# Patient Record
Sex: MALE | Race: ASIAN | NOT HISPANIC OR LATINO
[De-identification: names, ages, dates, MRNs, and addresses within clinical notes are randomized per-mention and may not be internally consistent; named-entity substitution may affect disease eponyms.]

---

## 2019-10-11 ENCOUNTER — TRANSCRIPTION ENCOUNTER (OUTPATIENT)
Age: 39
End: 2019-10-11

## 2021-05-03 PROBLEM — Z00.00 ENCOUNTER FOR PREVENTIVE HEALTH EXAMINATION: Status: ACTIVE | Noted: 2021-05-03

## 2021-05-07 ENCOUNTER — NON-APPOINTMENT (OUTPATIENT)
Age: 41
End: 2021-05-07

## 2021-05-07 ENCOUNTER — RX CHANGE (OUTPATIENT)
Age: 41
End: 2021-05-07

## 2021-05-07 ENCOUNTER — APPOINTMENT (OUTPATIENT)
Dept: PULMONOLOGY | Facility: CLINIC | Age: 41
End: 2021-05-07
Payer: MEDICAID

## 2021-05-07 ENCOUNTER — APPOINTMENT (OUTPATIENT)
Dept: CARDIOLOGY | Facility: CLINIC | Age: 41
End: 2021-05-07
Payer: MEDICAID

## 2021-05-07 VITALS
HEART RATE: 94 BPM | DIASTOLIC BLOOD PRESSURE: 80 MMHG | RESPIRATION RATE: 16 BRPM | SYSTOLIC BLOOD PRESSURE: 122 MMHG | BODY MASS INDEX: 31.18 KG/M2 | TEMPERATURE: 97 F | HEIGHT: 74 IN | WEIGHT: 243 LBS | OXYGEN SATURATION: 97 %

## 2021-05-07 VITALS
WEIGHT: 243 LBS | DIASTOLIC BLOOD PRESSURE: 94 MMHG | SYSTOLIC BLOOD PRESSURE: 132 MMHG | BODY MASS INDEX: 31.18 KG/M2 | TEMPERATURE: 97.8 F | OXYGEN SATURATION: 98 % | HEIGHT: 74 IN | HEART RATE: 88 BPM | RESPIRATION RATE: 17 BRPM

## 2021-05-07 DIAGNOSIS — U07.1 COVID-19: ICD-10-CM

## 2021-05-07 DIAGNOSIS — Z78.9 OTHER SPECIFIED HEALTH STATUS: ICD-10-CM

## 2021-05-07 DIAGNOSIS — Z13.6 ENCOUNTER FOR SCREENING FOR CARDIOVASCULAR DISORDERS: ICD-10-CM

## 2021-05-07 DIAGNOSIS — R07.9 CHEST PAIN, UNSPECIFIED: ICD-10-CM

## 2021-05-07 DIAGNOSIS — R05 COUGH: ICD-10-CM

## 2021-05-07 PROCEDURE — 99072 ADDL SUPL MATRL&STAF TM PHE: CPT

## 2021-05-07 PROCEDURE — 93000 ELECTROCARDIOGRAM COMPLETE: CPT

## 2021-05-07 PROCEDURE — 99205 OFFICE O/P NEW HI 60 MIN: CPT

## 2021-05-07 PROCEDURE — 94060 EVALUATION OF WHEEZING: CPT

## 2021-05-07 PROCEDURE — 94727 GAS DIL/WSHOT DETER LNG VOL: CPT

## 2021-05-07 PROCEDURE — 94729 DIFFUSING CAPACITY: CPT

## 2021-05-07 PROCEDURE — 99203 OFFICE O/P NEW LOW 30 MIN: CPT | Mod: 25

## 2021-05-07 RX ORDER — ALBUTEROL SULFATE 90 UG/1
108 (90 BASE) INHALANT RESPIRATORY (INHALATION)
Refills: 0 | Status: ACTIVE | COMMUNITY
Start: 2021-05-07

## 2021-05-07 RX ORDER — RIVAROXABAN 10 MG/1
10 TABLET, FILM COATED ORAL DAILY
Qty: 60 | Refills: 0 | Status: DISCONTINUED | COMMUNITY
Start: 2021-05-07 | End: 2021-05-07

## 2021-05-07 NOTE — DISCUSSION/SUMMARY
[FreeTextEntry1] : 39 yo male with likely post covid 19 related complaints and PFT findings.I reviewed his hospital chart on line, including the chest xray findings. PFT results were reviewed with the patient. He was given a two week sample of trelegy with  PRN albuterol use as before. A chest xray will be repeated next month. He is to complete cardiology evaluation as planned and follow up with PMD.

## 2021-05-07 NOTE — HISTORY OF PRESENT ILLNESS
[Never] : never [TextBox_4] : 41 yo male post covid 19 infection one month ago presents for evaluation. The patient initially complained  of SOB admitted to [facility] for five days, treated with oral steroids, albuterol and supplemental oxygen. Bilateral infiltrates were noted on chest xray. Presently he complains of MORALES with dry cough but feels "better".He denies prior pulmonary problems. He was seen by cardiology this AM, who prescribed xarelto.He no longer uses oxygen and continues to use albuterol MDI PRN alone. [TextBox_29] : Denies snoring, daytime somnolence, apneic episodes, AM headaches

## 2021-05-07 NOTE — PROCEDURE
[FreeTextEntry1] : PFT results: Mild restriction with decreased diffusion. Mild bronchodilator response noted

## 2021-05-11 RX ORDER — RIVAROXABAN 10 MG/1
10 TABLET, FILM COATED ORAL
Qty: 60 | Refills: 0 | Status: DISCONTINUED | COMMUNITY
Start: 2021-05-07 | End: 2021-05-11

## 2021-05-13 ENCOUNTER — APPOINTMENT (OUTPATIENT)
Dept: CV DIAGNOSITCS | Facility: HOSPITAL | Age: 41
End: 2021-05-13
Payer: MEDICAID

## 2021-05-13 ENCOUNTER — OUTPATIENT (OUTPATIENT)
Dept: OUTPATIENT SERVICES | Facility: HOSPITAL | Age: 41
LOS: 1 days | End: 2021-05-13

## 2021-05-13 DIAGNOSIS — R07.9 CHEST PAIN, UNSPECIFIED: ICD-10-CM

## 2021-05-13 PROCEDURE — 93306 TTE W/DOPPLER COMPLETE: CPT | Mod: 26

## 2021-05-16 PROBLEM — Z13.6 SCREENING FOR CARDIOVASCULAR CONDITION: Status: ACTIVE | Noted: 2021-05-16

## 2021-05-16 NOTE — REASON FOR VISIT
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 reason not currently on anticoagulant ppx\par Chest still feels heavy, still coughing, talk/exertional SOB/dyspnea\par \par 12-lead ECG notes SR, normal axis, normal intervals.\par /80 Unremarkable physical exam\par Appers euvolemic.\par BMI 31-32\par \par Will arrange for a CXR and an echocardiogram to evaluate cardiac structure/function\par \par

## 2021-05-16 NOTE — REVIEW OF SYSTEMS
[SOB] : shortness of breath [Dyspnea on exertion] : dyspnea during exertion [Chest Discomfort] : chest discomfort [Palpitations] : palpitations [Negative] : Heme/Lymph

## 2021-06-14 RX ORDER — APIXABAN 2.5 MG/1
2.5 TABLET, FILM COATED ORAL
Qty: 60 | Refills: 0 | Status: ACTIVE | COMMUNITY
Start: 2021-05-11 | End: 1900-01-01

## 2021-06-18 ENCOUNTER — APPOINTMENT (OUTPATIENT)
Dept: PULMONOLOGY | Facility: CLINIC | Age: 41
End: 2021-06-18
Payer: MEDICAID

## 2021-06-18 VITALS
SYSTOLIC BLOOD PRESSURE: 148 MMHG | RESPIRATION RATE: 16 BRPM | HEART RATE: 107 BPM | WEIGHT: 248 LBS | OXYGEN SATURATION: 96 % | BODY MASS INDEX: 31.83 KG/M2 | DIASTOLIC BLOOD PRESSURE: 98 MMHG | HEIGHT: 74 IN | TEMPERATURE: 96 F

## 2021-06-18 DIAGNOSIS — R93.89 ABNORMAL FINDINGS ON DIAGNOSTIC IMAGING OF OTHER SPECIFIED BODY STRUCTURES: ICD-10-CM

## 2021-06-18 DIAGNOSIS — B94.8 SEQUELAE OF OTHER SPECIFIED INFECTIOUS AND PARASITIC DISEASES: ICD-10-CM

## 2021-06-18 PROCEDURE — 99213 OFFICE O/P EST LOW 20 MIN: CPT

## 2021-06-18 NOTE — DISCUSSION/SUMMARY
[FreeTextEntry1] : 39 yo male with resolution of covid 19 related complaints. I reviewed the chest xray images on line and discussed the findings with the patient. The abnormalities reported by the radiologist are subtle at best, but a non contrast chest CT will be performed for completion of his evaluation. He ia to follow up with his PMD as before.

## 2021-06-18 NOTE — REVIEW OF SYSTEMS
[Cough] : no cough [Sputum] : no sputum [SOB on Exertion] : no sob on exertion [Negative] : Endocrine

## 2021-06-18 NOTE — HISTORY OF PRESENT ILLNESS
[Never] : never [TextBox_4] : 41 yo male with hx of covid 19 infection presents for follow up. The patient feels "much better" with pulmonary complaints. He has not used albuterol. A chest xray was performed recently. [TextBox_29] : Denies snoring, daytime somnolence, apneic episodes, AM headaches

## 2021-10-08 ENCOUNTER — NON-APPOINTMENT (OUTPATIENT)
Age: 41
End: 2021-10-08

## 2022-04-19 ENCOUNTER — APPOINTMENT (OUTPATIENT)
Dept: PULMONOLOGY | Facility: CLINIC | Age: 42
End: 2022-04-19
Payer: MEDICAID

## 2022-04-19 VITALS
TEMPERATURE: 98 F | HEART RATE: 81 BPM | SYSTOLIC BLOOD PRESSURE: 136 MMHG | WEIGHT: 253 LBS | OXYGEN SATURATION: 98 % | BODY MASS INDEX: 32.48 KG/M2 | DIASTOLIC BLOOD PRESSURE: 96 MMHG

## 2022-04-19 DIAGNOSIS — J98.4 OTHER DISORDERS OF LUNG: ICD-10-CM

## 2022-04-19 DIAGNOSIS — R06.00 DYSPNEA, UNSPECIFIED: ICD-10-CM

## 2022-04-19 DIAGNOSIS — R07.89 OTHER CHEST PAIN: ICD-10-CM

## 2022-04-19 PROCEDURE — 94729 DIFFUSING CAPACITY: CPT

## 2022-04-19 PROCEDURE — 94060 EVALUATION OF WHEEZING: CPT

## 2022-04-19 PROCEDURE — 99214 OFFICE O/P EST MOD 30 MIN: CPT | Mod: 25

## 2022-04-19 PROCEDURE — 94727 GAS DIL/WSHOT DETER LNG VOL: CPT

## 2022-04-19 RX ORDER — ALBUTEROL SULFATE 90 UG/1
108 (90 BASE) INHALANT RESPIRATORY (INHALATION)
Qty: 1 | Refills: 3 | Status: ACTIVE | COMMUNITY
Start: 2021-05-07 | End: 1900-01-01

## 2022-05-21 PROBLEM — J98.4 RESTRICTIVE AIRWAY DISEASE: Status: ACTIVE | Noted: 2022-05-21

## 2022-05-21 PROBLEM — R06.00 DOE (DYSPNEA ON EXERTION): Status: ACTIVE | Noted: 2021-05-07

## 2022-05-21 PROBLEM — R07.89 ATYPICAL CHEST PAIN: Status: ACTIVE | Noted: 2022-05-21

## 2022-05-21 NOTE — DISCUSSION/SUMMARY
[FreeTextEntry1] : 42 yo male with atypical chest pain with mild restriction on PFT with normal diffusion with mild bronchodilator response. I reviewed the study with the patient. PRN albuterol MDI use recommended. Cardiac evaluation with 2D echo recommended. Repeat chest CT and PFT in the future.He is to follow up with his PMD as before.

## 2022-05-21 NOTE — HISTORY OF PRESENT ILLNESS
[Never] : never [TextBox_4] : 42 yo male with abnormal chest CT findings presents complaining of chest heaviness with MORALES for three weeks. The patient denies cough or hemoptysis. He states that his symptoms resolved spontaneously recently without albuterol use.He did not use albuterol MDI prescribed in the past. [TextBox_29] : Denies snoring, daytime somnolence, apneic episodes, AM headaches

## 2022-05-21 NOTE — REVIEW OF SYSTEMS
[Cough] : no cough [Hemoptysis] : no hemoptysis [Chest Tightness] : chest tightness [Sputum] : no sputum [SOB on Exertion] : sob on exertion [Negative] : Endocrine

## 2022-10-18 ENCOUNTER — APPOINTMENT (OUTPATIENT)
Dept: PULMONOLOGY | Facility: CLINIC | Age: 42
End: 2022-10-18

## 2024-12-17 ENCOUNTER — APPOINTMENT (OUTPATIENT)
Dept: PULMONOLOGY | Facility: CLINIC | Age: 44
End: 2024-12-17
Payer: MEDICAID

## 2024-12-17 ENCOUNTER — NON-APPOINTMENT (OUTPATIENT)
Age: 44
End: 2024-12-17

## 2024-12-17 VITALS
DIASTOLIC BLOOD PRESSURE: 98 MMHG | BODY MASS INDEX: 35.55 KG/M2 | OXYGEN SATURATION: 97 % | HEART RATE: 106 BPM | HEIGHT: 74 IN | WEIGHT: 277 LBS | SYSTOLIC BLOOD PRESSURE: 138 MMHG | TEMPERATURE: 96.9 F

## 2024-12-17 PROCEDURE — 99214 OFFICE O/P EST MOD 30 MIN: CPT | Mod: 25

## 2024-12-17 PROCEDURE — 94729 DIFFUSING CAPACITY: CPT

## 2024-12-17 PROCEDURE — 94727 GAS DIL/WSHOT DETER LNG VOL: CPT

## 2024-12-17 PROCEDURE — 94060 EVALUATION OF WHEEZING: CPT

## 2024-12-17 RX ORDER — ALBUTEROL SULFATE 90 UG/1
108 (90 BASE) INHALANT RESPIRATORY (INHALATION)
Qty: 1 | Refills: 3 | Status: ACTIVE | COMMUNITY
Start: 2024-12-17 | End: 1900-01-01

## 2025-03-18 ENCOUNTER — APPOINTMENT (OUTPATIENT)
Dept: PULMONOLOGY | Facility: CLINIC | Age: 45
End: 2025-03-18
Payer: MEDICAID

## 2025-03-18 VITALS
BODY MASS INDEX: 35.68 KG/M2 | HEIGHT: 74 IN | SYSTOLIC BLOOD PRESSURE: 132 MMHG | OXYGEN SATURATION: 96 % | TEMPERATURE: 97.3 F | HEART RATE: 98 BPM | WEIGHT: 278 LBS | DIASTOLIC BLOOD PRESSURE: 76 MMHG

## 2025-03-18 VITALS
RESPIRATION RATE: 16 BRPM | OXYGEN SATURATION: 96 % | WEIGHT: 278 LBS | BODY MASS INDEX: 35.68 KG/M2 | HEART RATE: 98 BPM | SYSTOLIC BLOOD PRESSURE: 132 MMHG | TEMPERATURE: 97.3 F | DIASTOLIC BLOOD PRESSURE: 76 MMHG | HEIGHT: 74 IN

## 2025-03-18 PROCEDURE — 99214 OFFICE O/P EST MOD 30 MIN: CPT

## 2025-09-16 ENCOUNTER — APPOINTMENT (OUTPATIENT)
Dept: PULMONOLOGY | Facility: CLINIC | Age: 45
End: 2025-09-16